# Patient Record
(demographics unavailable — no encounter records)

---

## 2024-10-22 NOTE — PROCEDURE
[IUD Removal] : intrauterine device (IUD) removal [Consent Obtained] : Consent obtained [Time out performed] : Pre-procedure time out performed.  Patient's name, date of birth and procedure confirmed. [ IUD] :  IUD [Risks] : risks [Benefits] : benefits [Alternatives] : alternatives [Patient] : patient [Speculum Placed] : speculum placed [IUD Removed - Hook] : IUD removed - radha [IUD Discarded] : IUD discarded [Sent to Pathology] : specimen was placed in buffered formalin and sent for pathology [No Complications] : no complications [Hemostatis Obtained] : hemostatis obtained [Heavy Vaginal Bleeding] : for heavy vaginal bleeding [Pelvic Pain] : for pelvic pain [PRN] : as needed [de-identified] : pt was in pain, did not tolerate well.  [FreeTextEntry6] : paragard not placed, pt declines. desires ocp, erx for junel sent to pharmacy. rto for routine care, sooner as needed.

## 2025-06-11 NOTE — PHYSICAL EXAM
[No Acute Distress] : no acute distress [Well Nourished] : well nourished [Well-Appearing] : well-appearing [Normal Sclera/Conjunctiva] : normal sclera/conjunctiva [PERRL] : pupils equal round and reactive to light [Normal Outer Ear/Nose] : the outer ears and nose were normal in appearance [No Respiratory Distress] : no respiratory distress  [No Accessory Muscle Use] : no accessory muscle use [Clear to Auscultation] : lungs were clear to auscultation bilaterally [Normal Rate] : normal rate  [Regular Rhythm] : with a regular rhythm [Normal S1, S2] : normal S1 and S2 [No Edema] : there was no peripheral edema [Soft] : abdomen soft [Non Tender] : non-tender [Normal Posterior Cervical Nodes] : no posterior cervical lymphadenopathy [Normal Anterior Cervical Nodes] : no anterior cervical lymphadenopathy [No CVA Tenderness] : no CVA  tenderness [No Spinal Tenderness] : no spinal tenderness [Coordination Grossly Intact] : coordination grossly intact [Normal Affect] : the affect was normal [Normal Insight/Judgement] : insight and judgment were intact

## 2025-06-11 NOTE — PHYSICAL EXAM
[No Acute Distress] : no acute distress [Well Nourished] : well nourished [Well-Appearing] : well-appearing [Normal Sclera/Conjunctiva] : normal sclera/conjunctiva [PERRL] : pupils equal round and reactive to light [Normal Outer Ear/Nose] : the outer ears and nose were normal in appearance [No Respiratory Distress] : no respiratory distress  [No Accessory Muscle Use] : no accessory muscle use [Clear to Auscultation] : lungs were clear to auscultation bilaterally [Normal Rate] : normal rate  [Regular Rhythm] : with a regular rhythm [Normal S1, S2] : normal S1 and S2 [No Edema] : there was no peripheral edema [Soft] : abdomen soft [Non Tender] : non-tender [Normal Posterior Cervical Nodes] : no posterior cervical lymphadenopathy [Normal Anterior Cervical Nodes] : no anterior cervical lymphadenopathy [No CVA Tenderness] : no CVA  tenderness [No Spinal Tenderness] : no spinal tenderness [Coordination Grossly Intact] : coordination grossly intact [Normal Insight/Judgement] : insight and judgment were intact [Normal Affect] : the affect was normal

## 2025-06-13 NOTE — HEALTH RISK ASSESSMENT
[Excellent] : ~his/her~  mood as  excellent [No] : No [No falls in past year] : Patient reported no falls in the past year [Little interest or pleasure doing things] : 1) Little interest or pleasure doing things [Feeling down, depressed, or hopeless] : 2) Feeling down, depressed, or hopeless [0] : 2) Feeling down, depressed, or hopeless: Not at all (0) [PHQ-2 Negative - No further assessment needed] : PHQ-2 Negative - No further assessment needed [Never] : Never [Patient reported PAP Smear was normal] : Patient reported PAP Smear was normal [With Family] : lives with family [College] : College [Feels Safe at Home] : Feels safe at home [[id number]] : 0 [Change in mental status noted] : No change in mental status noted [Language] : denies difficulty with language [Behavior] : denies difficulty with behavior [Learning/Retaining New Information] : denies difficulty learning/retaining new information [Handling Complex Tasks] : denies difficulty handling complex tasks [Reasoning] : denies difficulty with reasoning [Spatial Ability and Orientation] : denies difficulty with spatial ability and orientation [High Risk Behavior] : no high risk behavior [PapSmearDate] : 09/24 [HIVDate] : 09/24 [HepatitisCDate] : 09/24

## 2025-06-13 NOTE — HISTORY OF PRESENT ILLNESS
[FreeTextEntry1] : CPE, school forms  [de-identified] : Ms. Prakash is a 29 y.o. woman ([employer] employee) with no PMH presenting for CPE and form completion. Pt is new to our clinic. 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 She feels well with no acute concerns.   Regarding HCM, pt saw GYN in 9/2024 and underwent Pap/HPV which were normal. HIV negative, hepatitis/STI testing also negative. She had a Mirena IUD previously and was planning to have it removed and replaced however the IUD removal was extremely painful and she decided to not have it replaced. She currently is using the [employer] tab to track her menses. She describes her mental health as good; she speaks with a therapist weekly. No alcohol, tobacco, recreational drug use.  Pt works in strategic planning. Mood/Diet/Exercise/Sleep/Stress

## 2025-06-13 NOTE — HISTORY OF PRESENT ILLNESS
[FreeTextEntry1] : CPE, school forms  [de-identified] : Ms. Prakash is a 29 y.o. woman ([employer] employee) with no PMH presenting for CPE and form completion. Pt is new to our clinic. 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 She feels well with no acute concerns.   Regarding HCM, pt saw GYN in 9/2024 and underwent Pap/HPV which were normal. HIV negative, hepatitis/STI testing also negative. She had a Mirena IUD previously and was planning to have it removed and replaced however the IUD removal was extremely painful and she decided to not have it replaced. She currently is using the [employer] tab to track her menses. She describes her mental health as good; she speaks with a therapist weekly. No alcohol, tobacco, recreational drug use.  Pt works in strategic planning. Mood/Diet/Exercise/Sleep/Stress

## 2025-06-13 NOTE — ASSESSMENT
[Vaccines Reviewed] : Immunizations reviewed today. Please see immunization details in the vaccine log within the immunization flowsheet.  [FreeTextEntry1] : Ms. Prakash is a 29 y.o. woman w/ no PMH here to establish care and for pre-enrollment forms.   #HCM - Pap/HPV up to date (9/2024) - Vaccines UTD  - A1c, lipid panel, CBC, CMP ordered - SDOH screen negative   #Pre-Enrollment Forms - Hep B surface Ag, MMR/Varicella IgG, Quant Gold ordered - Will complete forms once results back.   F/u in 1 year for CPE or sooner if issues arise.

## 2025-06-13 NOTE — HEALTH RISK ASSESSMENT
[Excellent] : ~his/her~  mood as  excellent [No] : No [No falls in past year] : Patient reported no falls in the past year [Little interest or pleasure doing things] : 1) Little interest or pleasure doing things [Feeling down, depressed, or hopeless] : 2) Feeling down, depressed, or hopeless [0] : 2) Feeling down, depressed, or hopeless: Not at all (0) [PHQ-2 Negative - No further assessment needed] : PHQ-2 Negative - No further assessment needed [Never] : Never [Patient reported PAP Smear was normal] : Patient reported PAP Smear was normal [With Family] : lives with family [College] : College [Feels Safe at Home] : Feels safe at home [[id number]] : 0 [Change in mental status noted] : No change in mental status noted [Language] : denies difficulty with language [Behavior] : denies difficulty with behavior [Learning/Retaining New Information] : denies difficulty learning/retaining new information [Handling Complex Tasks] : denies difficulty handling complex tasks [Reasoning] : denies difficulty with reasoning [Spatial Ability and Orientation] : denies difficulty with spatial ability and orientation [High Risk Behavior] : no high risk behavior [PapSmearDate] : 09/24 [HIVDate] : 09/24 [HepatitisCDate] : 09/24

## 2025-06-13 NOTE — HISTORY OF PRESENT ILLNESS
[FreeTextEntry1] : CPE, school forms  [de-identified] : Ms. Prakash is a 29 y.o. woman ([employer] employee) with no PMH presenting for CPE and form completion. Pt is new to our clinic. 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 She feels well with no acute concerns.   Regarding HCM, pt saw GYN in 9/2024 and underwent Pap/HPV which were normal. HIV negative, hepatitis/STI testing also negative. She had a Mirena IUD previously and was planning to have it removed and replaced however the IUD removal was extremely painful and she decided to not have it replaced. She currently is using the [employer] tab to track her menses. She describes her mental health as good; she speaks with a therapist weekly. No alcohol, tobacco, recreational drug use.  Pt works in strategic planning. Mood/Diet/Exercise/Sleep/Stress